# Patient Record
Sex: FEMALE | Race: WHITE | NOT HISPANIC OR LATINO | URBAN - METROPOLITAN AREA
[De-identification: names, ages, dates, MRNs, and addresses within clinical notes are randomized per-mention and may not be internally consistent; named-entity substitution may affect disease eponyms.]

---

## 2023-09-06 ENCOUNTER — OFFICE VISIT (OUTPATIENT)
Dept: URBAN - METROPOLITAN AREA CLINIC 115 | Facility: CLINIC | Age: 28
End: 2023-09-06

## 2023-09-11 ENCOUNTER — OFFICE VISIT (OUTPATIENT)
Dept: URBAN - METROPOLITAN AREA CLINIC 115 | Facility: CLINIC | Age: 28
End: 2023-09-11
Payer: MEDICAID

## 2023-09-11 VITALS
WEIGHT: 151.2 LBS | TEMPERATURE: 98.8 F | BODY MASS INDEX: 22.39 KG/M2 | SYSTOLIC BLOOD PRESSURE: 108 MMHG | HEART RATE: 80 BPM | DIASTOLIC BLOOD PRESSURE: 67 MMHG | HEIGHT: 69 IN

## 2023-09-11 DIAGNOSIS — R17 ELEVATED BILIRUBIN: ICD-10-CM

## 2023-09-11 DIAGNOSIS — K52.89 (LYMPHOCYTIC) MICROSCOPIC COLITIS: ICD-10-CM

## 2023-09-11 DIAGNOSIS — R19.5 STRONG ODOR OF STOOLS: ICD-10-CM

## 2023-09-11 PROCEDURE — 99204 OFFICE O/P NEW MOD 45 MIN: CPT | Performed by: INTERNAL MEDICINE

## 2023-09-11 RX ORDER — IBUPROFEN 800 MG/1
1 TABLET WITH FOOD OR MILK AS NEEDED TABLET, FILM COATED ORAL
Status: ACTIVE | COMMUNITY

## 2023-09-11 RX ORDER — METHYLPREDNISOLONE 4 MG/1
1 TABLET WITH FOOD OR MILK TABLET ORAL
Status: ACTIVE | COMMUNITY

## 2023-09-11 RX ORDER — CETIRIZINE HYDROCHLORIDE 10 MG/1
1 TABLET TABLET, FILM COATED ORAL ONCE A DAY
Status: ACTIVE | COMMUNITY

## 2023-09-11 RX ORDER — MULTIVIT-MIN/IRON/FOLIC ACID/K 18-600-40
AS DIRECTED CAPSULE ORAL
Status: ACTIVE | COMMUNITY

## 2023-09-11 RX ORDER — SERTRALINE 100 MG/1
1 TABLET TABLET, FILM COATED ORAL ONCE A DAY
Status: ACTIVE | COMMUNITY

## 2023-09-11 NOTE — HPI-TODAY'S VISIT:
28 y/o white female presents for abnormal liver testing. Believes her  Always had loose stools, floating stools.  Pale appearing, sometimes dark. Mother has similar symptoms chronically.  She and mom are both very fair appearing, believes family traces back to [place].  Has never been tested for celiac.  Eating salads seems to help. FAst food makess worse.   Other than that eating healthy doesn't seem to impact.  Discussed symptoms with PCP who did labs.  Had abnormal results, patient believes just bilirubin however not sure.  WE don't have those records and her phone battery is dead so she can't retrieve them during our visit.

## 2023-09-13 ENCOUNTER — TELEPHONE ENCOUNTER (OUTPATIENT)
Dept: URBAN - METROPOLITAN AREA CLINIC 115 | Facility: CLINIC | Age: 28
End: 2023-09-13

## 2023-09-20 ENCOUNTER — LAB OUTSIDE AN ENCOUNTER (OUTPATIENT)
Dept: URBAN - METROPOLITAN AREA CLINIC 115 | Facility: CLINIC | Age: 28
End: 2023-09-20

## 2023-09-22 LAB
ADENOVIRUS F 40/41: NOT DETECTED
CALPROTECTIN, STOOL - QDX: (no result)
CAMPYLOBACTER: NOT DETECTED
CLOSTRIDIUM DIFFICILE: NOT DETECTED
ENTAMOEBA HISTOLYTICA: NOT DETECTED
ENTEROAGGREGATIVE E.COLI: NOT DETECTED
ENTEROTOXIGENIC E.COLI: NOT DETECTED
ESCHERICHIA COLI O157: NOT DETECTED
GIARDIA LAMBLIA: NOT DETECTED
H. PYLORI (EIA) - QDX: NEGATIVE
NOROVIRUS GI/GII: NOT DETECTED
PANCREATICELASTASE ELISA, STOOL: (no result)
ROTAVIRUS A: NOT DETECTED
SALMONELLA SPP.: NOT DETECTED
SHIGA-LIKE TOXIN PRODUCING E.COLI: NOT DETECTED
SHIGELLA SPP. / ENTEROINVASIVE E.COLI: NOT DETECTED
VIBRIO PARAHAEMOLYTICUS: NOT DETECTED
VIBRIO SPP.: NOT DETECTED
YERSINIA ENTEROCOLITICA: NOT DETECTED

## 2023-11-22 ENCOUNTER — CLAIMS CREATED FROM THE CLAIM WINDOW (OUTPATIENT)
Dept: URBAN - METROPOLITAN AREA CLINIC 115 | Facility: CLINIC | Age: 28
End: 2023-11-22

## 2023-11-22 ENCOUNTER — CLAIMS CREATED FROM THE CLAIM WINDOW (OUTPATIENT)
Dept: URBAN - METROPOLITAN AREA CLINIC 115 | Facility: CLINIC | Age: 28
End: 2023-11-22
Payer: MEDICAID

## 2023-11-22 ENCOUNTER — OFFICE VISIT (OUTPATIENT)
Dept: URBAN - METROPOLITAN AREA CLINIC 115 | Facility: CLINIC | Age: 28
End: 2023-11-22

## 2023-11-22 VITALS
BODY MASS INDEX: 21.95 KG/M2 | HEIGHT: 69 IN | SYSTOLIC BLOOD PRESSURE: 122 MMHG | RESPIRATION RATE: 15 BRPM | DIASTOLIC BLOOD PRESSURE: 69 MMHG | TEMPERATURE: 98.3 F | HEART RATE: 102 BPM | WEIGHT: 148.2 LBS

## 2023-11-22 DIAGNOSIS — R17 ELEVATED BILIRUBIN: ICD-10-CM

## 2023-11-22 DIAGNOSIS — K52.9 CHRONIC DIARRHEA: ICD-10-CM

## 2023-11-22 DIAGNOSIS — R19.7 CHRONIC DIARRHEA: ICD-10-CM

## 2023-11-22 DIAGNOSIS — R19.5 STRONG ODOR OF STOOLS: ICD-10-CM

## 2023-11-22 DIAGNOSIS — K21.9 GASTROESOPHAGEAL REFLUX DISEASE, UNSPECIFIED WHETHER ESOPHAGITIS PRESENT: ICD-10-CM

## 2023-11-22 PROCEDURE — 99214 OFFICE O/P EST MOD 30 MIN: CPT | Performed by: STUDENT IN AN ORGANIZED HEALTH CARE EDUCATION/TRAINING PROGRAM

## 2023-11-22 RX ORDER — SERTRALINE 100 MG/1
1 TABLET TABLET, FILM COATED ORAL ONCE A DAY
Status: ACTIVE | COMMUNITY

## 2023-11-22 RX ORDER — CETIRIZINE HYDROCHLORIDE 10 MG/1
1 TABLET TABLET, FILM COATED ORAL ONCE A DAY
Status: ACTIVE | COMMUNITY

## 2023-11-22 RX ORDER — MULTIVIT-MIN/IRON/FOLIC ACID/K 18-600-40
AS DIRECTED CAPSULE ORAL
Status: ACTIVE | COMMUNITY

## 2023-11-22 RX ORDER — OMEPRAZOLE 20 MG/1
1 CAPSULE 30 MINUTES BEFORE MORNING MEAL CAPSULE, DELAYED RELEASE ORAL ONCE A DAY
Qty: 30 | Refills: 1 | OUTPATIENT
Start: 2023-11-22

## 2023-11-22 RX ORDER — METHYLPREDNISOLONE 4 MG/1
1 TABLET WITH FOOD OR MILK TABLET ORAL
Status: ACTIVE | COMMUNITY

## 2023-11-22 RX ORDER — IBUPROFEN 800 MG/1
1 TABLET WITH FOOD OR MILK AS NEEDED TABLET, FILM COATED ORAL
Status: ACTIVE | COMMUNITY

## 2023-11-22 NOTE — PHYSICAL EXAM GASTROINTESTINAL
Abdomen soft, diffused abd discomfort to palpation equally, nondistended , no guarding or rigidity , no masses palpable , unremarkable bowel sounds , no hepatosplenomegaly

## 2023-11-22 NOTE — HPI-TODAY'S VISIT:
28 y/o white female presents for F/U on abnormal liver testing.   Recap of last visit:: Always had loose stools, floating stools.  Pale appearing, sometimes dark. Mother has similar symptoms chronically. Has never been tested for celiac. Eating salads seems to help. FAst food makess worse.   Other than that eating healthy doesn't seem to impact.   Labs on 06/2023: total meaghan 1.8, AST 15, ALT 14, ALK PHOS 48. Labs on 07/2023: Total meaghan 1.4, meaghan direct 0.4, hepatitis panel neg. GPP completed on 09/2023 grossly normal, EPI normal, h pylori normal.   Today visit: still having similar sx as last visit. Still having loose stools, BM 2-3x per day after meals. No melena or hematochezia. Occasional abd cramps, periumbilical region, short lived. Mom dx w IBS. She also reports of acid reflux, 2-3x per week on going for 2 years now. Takes tums otc as needed, minimal relief. Denies any overconsumption of acidic food.

## 2023-11-24 LAB
(TTG) AB, IGG: <1
ANTIGLIADIN ABS, IGA: <1
BILIRUBIN, DIRECT: 0.4
BILIRUBIN, INDIRECT: 1.2
BILIRUBIN, TOTAL: 1.6
GLIADIN (DEAMIDATED) AB (IGG): <1
IMMUNOGLOBULIN A: 112
T-TRANSGLUTAMINASE (TTG) IGA: <1

## 2024-02-05 ENCOUNTER — OV EP (OUTPATIENT)
Dept: URBAN - METROPOLITAN AREA CLINIC 115 | Facility: CLINIC | Age: 29
End: 2024-02-05
Payer: MEDICAID

## 2024-02-05 ENCOUNTER — LAB (OUTPATIENT)
Dept: URBAN - METROPOLITAN AREA CLINIC 115 | Facility: CLINIC | Age: 29
End: 2024-02-05

## 2024-02-05 VITALS
WEIGHT: 139.6 LBS | SYSTOLIC BLOOD PRESSURE: 112 MMHG | BODY MASS INDEX: 20.68 KG/M2 | TEMPERATURE: 98 F | HEIGHT: 69 IN | DIASTOLIC BLOOD PRESSURE: 73 MMHG | HEART RATE: 76 BPM

## 2024-02-05 DIAGNOSIS — K58.0 IRRITABLE BOWEL SYNDROME WITH DIARRHEA: ICD-10-CM

## 2024-02-05 DIAGNOSIS — R19.7 ACUTE DIARRHEA: ICD-10-CM

## 2024-02-05 DIAGNOSIS — R17 ELEVATED BILIRUBIN: ICD-10-CM

## 2024-02-05 PROBLEM — 197125005: Status: ACTIVE | Noted: 2024-02-05

## 2024-02-05 PROCEDURE — 99214 OFFICE O/P EST MOD 30 MIN: CPT | Performed by: INTERNAL MEDICINE

## 2024-02-05 RX ORDER — SERTRALINE 100 MG/1
1 TABLET TABLET, FILM COATED ORAL ONCE A DAY
Status: DISCONTINUED | COMMUNITY

## 2024-02-05 RX ORDER — OMEPRAZOLE 20 MG/1
1 CAPSULE 30 MINUTES BEFORE MORNING MEAL CAPSULE, DELAYED RELEASE ORAL ONCE A DAY
Qty: 30 | Refills: 1 | Status: DISCONTINUED | COMMUNITY
Start: 2023-11-22

## 2024-02-05 RX ORDER — METHYLPREDNISOLONE 4 MG/1
1 TABLET WITH FOOD OR MILK TABLET ORAL
Status: DISCONTINUED | COMMUNITY

## 2024-02-05 RX ORDER — CETIRIZINE HYDROCHLORIDE 10 MG/1
1 TABLET TABLET, FILM COATED ORAL ONCE A DAY
Status: ACTIVE | COMMUNITY

## 2024-02-05 RX ORDER — IBUPROFEN 800 MG/1
1 TABLET WITH FOOD OR MILK AS NEEDED TABLET, FILM COATED ORAL
Status: ACTIVE | COMMUNITY

## 2024-02-05 RX ORDER — MULTIVIT-MIN/IRON/FOLIC ACID/K 18-600-40
AS DIRECTED CAPSULE ORAL
Status: ACTIVE | COMMUNITY

## 2024-02-05 RX ORDER — RIFAXIMIN 550 MG/1
1 TABLET TABLET ORAL THREE TIMES A DAY
Qty: 42 TABLET | Refills: 1 | OUTPATIENT
Start: 2024-02-05 | End: 2024-03-04

## 2024-02-05 NOTE — HPI-TODAY'S VISIT:
26 y/o white female returns for chronic diarrhea and elevated bilirubin. Labs last visit show mild elevation of direct bilirubin. Denies RUQ pain.  Diarrhea continues.  Did look at lowFODMAP diet but feels she's already doing alot of it. Diarrhea occurs most days.  Pain only occurs if overeats.

## 2024-02-20 ENCOUNTER — COLON (OUTPATIENT)
Dept: URBAN - METROPOLITAN AREA SURGERY CENTER 13 | Facility: SURGERY CENTER | Age: 29
End: 2024-02-20

## 2024-03-26 ENCOUNTER — COLON (OUTPATIENT)
Dept: URBAN - METROPOLITAN AREA SURGERY CENTER 13 | Facility: SURGERY CENTER | Age: 29
End: 2024-03-26

## 2024-03-28 ENCOUNTER — COLON (OUTPATIENT)
Dept: URBAN - METROPOLITAN AREA SURGERY CENTER 13 | Facility: SURGERY CENTER | Age: 29
End: 2024-03-28

## 2024-04-15 ENCOUNTER — OV EP (OUTPATIENT)
Dept: URBAN - METROPOLITAN AREA CLINIC 115 | Facility: CLINIC | Age: 29
End: 2024-04-15

## 2024-08-21 ENCOUNTER — OFFICE VISIT (OUTPATIENT)
Dept: URBAN - METROPOLITAN AREA CLINIC 115 | Facility: CLINIC | Age: 29
End: 2024-08-21
Payer: MEDICAID

## 2024-08-21 ENCOUNTER — LAB OUTSIDE AN ENCOUNTER (OUTPATIENT)
Dept: URBAN - METROPOLITAN AREA CLINIC 115 | Facility: CLINIC | Age: 29
End: 2024-08-21

## 2024-08-21 ENCOUNTER — DASHBOARD ENCOUNTERS (OUTPATIENT)
Age: 29
End: 2024-08-21

## 2024-08-21 VITALS
DIASTOLIC BLOOD PRESSURE: 73 MMHG | BODY MASS INDEX: 19.7 KG/M2 | HEIGHT: 69 IN | TEMPERATURE: 97.8 F | WEIGHT: 133 LBS | HEART RATE: 83 BPM | SYSTOLIC BLOOD PRESSURE: 112 MMHG

## 2024-08-21 DIAGNOSIS — F12.90 MARIJUANA USER: ICD-10-CM

## 2024-08-21 DIAGNOSIS — K58.8 OTHER IRRITABLE BOWEL SYNDROME: ICD-10-CM

## 2024-08-21 DIAGNOSIS — R17 ELEVATED BILIRUBIN: ICD-10-CM

## 2024-08-21 DIAGNOSIS — L29.9 PRURITUS: ICD-10-CM

## 2024-08-21 PROCEDURE — 99214 OFFICE O/P EST MOD 30 MIN: CPT

## 2024-08-21 RX ORDER — IBUPROFEN 800 MG/1
1 TABLET WITH FOOD OR MILK AS NEEDED TABLET, FILM COATED ORAL
Status: ACTIVE | COMMUNITY

## 2024-08-21 RX ORDER — CETIRIZINE HYDROCHLORIDE 10 MG/1
1 TABLET TABLET, FILM COATED ORAL ONCE A DAY
Status: ACTIVE | COMMUNITY

## 2024-08-21 RX ORDER — MULTIVIT-MIN/IRON/FOLIC ACID/K 18-600-40
AS DIRECTED CAPSULE ORAL
Status: ACTIVE | COMMUNITY

## 2024-08-21 RX ORDER — SERTRALINE 100 MG/1
1 TABLET TABLET, FILM COATED ORAL ONCE A DAY
Status: ACTIVE | COMMUNITY

## 2024-08-21 NOTE — HPI-TODAY'S VISIT:
29 y/o F with PMH of chronic diarrhea presents with c/o unexplained weight loss of 5-10 lbs in 6 mos. Previously seen by Dr. Oneill and QIANA Carmichael; Celiac, GPP, calpro, H pylori, EPI neg; treated with low FODMAP diet, Xifaxan. US for increased direct bili not done (lost the paper for where she should be getting that done). Pt not willing to do colonoscopy.  States she had labs done with PCP on 7/19/24 which showed bilirubin 1.5, and direct 0.4, indirect is 1.1, AST 18, ALT 15, alkP 40, albumin 4.7. Today reports nausea after a BM; BMs are type 6-7, 1-2x a day, complete evacuation; previously tried bread/milk elimination which helps but doesn't resolve it. Low abominal burning sometimes with eating any normal foods. Had hemorrhoids 2.5 yrs ago when she was pregnant. Reports belching a lot over the years, worsening in the last 1.5 yrs. Does note that she eats about half a meal before getting full. Used to take tums. States she has xifaxan at home, didn't take it yet because she kept getting treatments for URIs. Also reports pruritus that is worse in armpits after wearing deodorant. Today she denies vomiting, constipation, dysphagia, odynophagia, change in appetite. Takes diclofenac prn and ibuprofen during the day; couple times a week. Reports occ EtOH (1-2 beers a month). Denies tobacco. Reports occ marijuana use (1-2x a month).

## 2024-09-05 ENCOUNTER — OFFICE VISIT (OUTPATIENT)
Dept: URBAN - METROPOLITAN AREA CLINIC 114 | Facility: CLINIC | Age: 29
End: 2024-09-05
Payer: MEDICAID

## 2024-09-05 DIAGNOSIS — R17 ELEVATED BILIRUBIN: ICD-10-CM

## 2024-09-05 PROCEDURE — 76705 ECHO EXAM OF ABDOMEN: CPT | Performed by: INTERNAL MEDICINE

## 2024-09-11 ENCOUNTER — TELEPHONE ENCOUNTER (OUTPATIENT)
Dept: URBAN - METROPOLITAN AREA CLINIC 115 | Facility: CLINIC | Age: 29
End: 2024-09-11

## 2024-09-23 ENCOUNTER — LAB OUTSIDE AN ENCOUNTER (OUTPATIENT)
Dept: URBAN - METROPOLITAN AREA CLINIC 115 | Facility: CLINIC | Age: 29
End: 2024-09-23

## 2024-09-23 ENCOUNTER — OFFICE VISIT (OUTPATIENT)
Dept: URBAN - METROPOLITAN AREA CLINIC 115 | Facility: CLINIC | Age: 29
End: 2024-09-23
Payer: MEDICAID

## 2024-09-23 VITALS
TEMPERATURE: 98.3 F | HEART RATE: 79 BPM | SYSTOLIC BLOOD PRESSURE: 113 MMHG | WEIGHT: 132.8 LBS | HEIGHT: 69 IN | DIASTOLIC BLOOD PRESSURE: 73 MMHG | BODY MASS INDEX: 19.67 KG/M2

## 2024-09-23 DIAGNOSIS — R58 EXCESSIVE BLEEDING: ICD-10-CM

## 2024-09-23 DIAGNOSIS — K58.0 IRRITABLE BOWEL SYNDROME WITH DIARRHEA: ICD-10-CM

## 2024-09-23 DIAGNOSIS — R11.2 NAUSEA AND VOMITING IN ADULT: ICD-10-CM

## 2024-09-23 DIAGNOSIS — R10.11 RUQ PAIN: ICD-10-CM

## 2024-09-23 PROCEDURE — 99213 OFFICE O/P EST LOW 20 MIN: CPT

## 2024-09-23 RX ORDER — CETIRIZINE HYDROCHLORIDE 10 MG/1
1 TABLET TABLET, FILM COATED ORAL ONCE A DAY
Status: ON HOLD | COMMUNITY

## 2024-09-23 RX ORDER — SERTRALINE 100 MG/1
1 TABLET TABLET, FILM COATED ORAL ONCE A DAY
Status: ON HOLD | COMMUNITY

## 2024-09-23 RX ORDER — IBUPROFEN 800 MG/1
1 TABLET WITH FOOD OR MILK AS NEEDED TABLET, FILM COATED ORAL
Status: ON HOLD | COMMUNITY

## 2024-09-23 RX ORDER — MULTIVIT-MIN/IRON/FOLIC ACID/K 18-600-40
AS DIRECTED CAPSULE ORAL
Status: ON HOLD | COMMUNITY

## 2024-09-23 NOTE — HPI-TODAY'S VISIT:
27 y/o F with PMH of chronic diarrhea presents with c/o unexplained weight loss of 5-10 lbs in 6 mos. Previously seen by Dr. Oneill and QIANA Carmichael; Celiac, GPP, calpro, H pylori, EPI neg; treated with low FODMAP diet, Xifaxan. US for increased direct bili normal. Pt not willing to do colonoscopy.  PCP labs on 7/19/24 which showed bilirubin 1.5, and direct 0.4, indirect is 1.1, AST 18, ALT 15, alkP 40, albumin 4.7. Still havingnausea after a BM; BMs are type 6-7, 1-2x a day, complete evacuation; previously tried bread/milk elimination which helps but doesn't resolve it.   Takes diclofenac prn and ibuprofen during the day; couple times a week. Reports occ EtOH (1-2 beers a month). Denies tobacco. Reports occ marijuana use (1-2x a month); trialed heat for nausea without any relief.  Liver labs not done. States that she started xifaxan for about 5 days but then started a medication for a toe infection and didn't want to take the meds together. Notes when she had US done, it was very painful, but no gallstones identified. States she stays away from dairy because it upsets her stomach. Asking if she can check for anemia in her bloodwork, notes she bleeds easily with regular lab draws and required a pressure dressing, irregular menses that she sees her OBGYN for. Unsure if she has any blood disorders in her family.

## 2024-11-04 ENCOUNTER — OFFICE VISIT (OUTPATIENT)
Dept: URBAN - METROPOLITAN AREA CLINIC 115 | Facility: CLINIC | Age: 29
End: 2024-11-04